# Patient Record
Sex: MALE | Race: BLACK OR AFRICAN AMERICAN | NOT HISPANIC OR LATINO | URBAN - METROPOLITAN AREA
[De-identification: names, ages, dates, MRNs, and addresses within clinical notes are randomized per-mention and may not be internally consistent; named-entity substitution may affect disease eponyms.]

---

## 2018-09-05 ENCOUNTER — INPATIENT HOSPITAL (OUTPATIENT)
Dept: URBAN - METROPOLITAN AREA HOSPITAL 95 | Facility: HOSPITAL | Age: 58
End: 2018-09-05
Payer: COMMERCIAL

## 2018-09-05 DIAGNOSIS — D50.9 IRON DEFICIENCY ANEMIA, UNSPECIFIED: ICD-10-CM

## 2018-09-05 DIAGNOSIS — K92.1 MELENA: ICD-10-CM

## 2018-09-05 PROCEDURE — 99254 IP/OBS CNSLTJ NEW/EST MOD 60: CPT | Performed by: INTERNAL MEDICINE

## 2018-09-06 ENCOUNTER — INPATIENT HOSPITAL (OUTPATIENT)
Dept: URBAN - METROPOLITAN AREA HOSPITAL 95 | Facility: HOSPITAL | Age: 58
End: 2018-09-06
Payer: COMMERCIAL

## 2018-09-06 DIAGNOSIS — D50.9 IRON DEFICIENCY ANEMIA, UNSPECIFIED: ICD-10-CM

## 2018-09-06 DIAGNOSIS — K64.8 OTHER HEMORRHOIDS: ICD-10-CM

## 2018-09-06 DIAGNOSIS — D12.2 BENIGN NEOPLASM OF ASCENDING COLON: ICD-10-CM

## 2018-09-06 DIAGNOSIS — K29.80 DUODENITIS WITHOUT BLEEDING: ICD-10-CM

## 2018-09-06 DIAGNOSIS — K92.2 GASTROINTESTINAL HEMORRHAGE, UNSPECIFIED: ICD-10-CM

## 2018-09-06 DIAGNOSIS — K92.1 MELENA: ICD-10-CM

## 2018-09-06 DIAGNOSIS — K31.7 POLYP OF STOMACH AND DUODENUM: ICD-10-CM

## 2018-09-06 DIAGNOSIS — K29.70 GASTRITIS, UNSPECIFIED, WITHOUT BLEEDING: ICD-10-CM

## 2018-09-06 PROCEDURE — 43239 EGD BIOPSY SINGLE/MULTIPLE: CPT | Performed by: INTERNAL MEDICINE

## 2018-09-07 PROCEDURE — 45380 COLONOSCOPY AND BIOPSY: CPT | Performed by: INTERNAL MEDICINE

## 2018-10-18 ENCOUNTER — OFFICE (OUTPATIENT)
Dept: URBAN - METROPOLITAN AREA CLINIC 11 | Facility: CLINIC | Age: 58
End: 2018-10-18
Payer: COMMERCIAL

## 2018-10-18 VITALS — WEIGHT: 315 LBS | SYSTOLIC BLOOD PRESSURE: 142 MMHG | DIASTOLIC BLOOD PRESSURE: 82 MMHG | HEIGHT: 70 IN

## 2018-10-18 DIAGNOSIS — K59.00 CONSTIPATION, UNSPECIFIED: ICD-10-CM

## 2018-10-18 DIAGNOSIS — E66.01 MORBID (SEVERE) OBESITY DUE TO EXCESS CALORIES: ICD-10-CM

## 2018-10-18 DIAGNOSIS — D50.9 IRON DEFICIENCY ANEMIA, UNSPECIFIED: ICD-10-CM

## 2018-10-18 DIAGNOSIS — N18.9 CHRONIC KIDNEY DISEASE, UNSPECIFIED: ICD-10-CM

## 2018-10-18 LAB
ANEMIA PROFILE A: BASO (ABSOLUTE): 0 X10E3/UL (ref 0–0.2)
ANEMIA PROFILE A: BASOS: 0 %
ANEMIA PROFILE A: EOS (ABSOLUTE): 0.1 X10E3/UL (ref 0–0.4)
ANEMIA PROFILE A: EOS: 2 %
ANEMIA PROFILE A: HEMATOCRIT: 31.9 % — LOW (ref 37.5–51)
ANEMIA PROFILE A: HEMOGLOBIN: 9.3 G/DL — LOW (ref 13–17.7)
ANEMIA PROFILE A: IMMATURE GRANS (ABS): 0 X10E3/UL (ref 0–0.1)
ANEMIA PROFILE A: IMMATURE GRANULOCYTES: 0 %
ANEMIA PROFILE A: IRON BIND.CAP.(TIBC): 315 UG/DL (ref 250–450)
ANEMIA PROFILE A: IRON SATURATION: 10 % — LOW (ref 15–55)
ANEMIA PROFILE A: IRON: 31 UG/DL — LOW (ref 38–169)
ANEMIA PROFILE A: LYMPHS (ABSOLUTE): 1.5 X10E3/UL (ref 0.7–3.1)
ANEMIA PROFILE A: LYMPHS: 17 %
ANEMIA PROFILE A: MCH: 24.8 PG — LOW (ref 26.6–33)
ANEMIA PROFILE A: MCHC: 29.2 G/DL — LOW (ref 31.5–35.7)
ANEMIA PROFILE A: MCV: 85 FL (ref 79–97)
ANEMIA PROFILE A: MONOCYTES(ABSOLUTE): 0.6 X10E3/UL (ref 0.1–0.9)
ANEMIA PROFILE A: MONOCYTES: 7 %
ANEMIA PROFILE A: NEUTROPHILS (ABSOLUTE): 6.7 X10E3/UL (ref 1.4–7)
ANEMIA PROFILE A: NEUTROPHILS: 74 %
ANEMIA PROFILE A: PLATELETS: 434 X10E3/UL — HIGH (ref 150–379)
ANEMIA PROFILE A: RBC: 3.75 X10E6/UL — LOW (ref 4.14–5.8)
ANEMIA PROFILE A: RDW: 16.7 % — HIGH (ref 12.3–15.4)
ANEMIA PROFILE A: RETICULOCYTE COUNT: 2.3 % (ref 0.6–2.6)
ANEMIA PROFILE A: UIBC: 284 UG/DL (ref 111–343)
ANEMIA PROFILE A: WBC: 8.9 X10E3/UL (ref 3.4–10.8)
FERRITIN, SERUM: 22 NG/ML — LOW (ref 30–400)
THYROID PANEL WITH TSH: FREE THYROXINE INDEX: 2 (ref 1.2–4.9)
THYROID PANEL WITH TSH: T3 UPTAKE: 27 % (ref 24–39)
THYROID PANEL WITH TSH: THYROXINE (T4): 7.3 UG/DL (ref 4.5–12)
THYROID PANEL WITH TSH: TSH: 0.85 UIU/ML (ref 0.45–4.5)

## 2018-10-18 PROCEDURE — 99213 OFFICE O/P EST LOW 20 MIN: CPT | Performed by: INTERNAL MEDICINE

## 2018-10-18 RX ORDER — PANTOPRAZOLE SODIUM 40 MG/1
40 TABLET, DELAYED RELEASE ORAL
Qty: 30 | Refills: 11 | Status: ACTIVE
Start: 2018-10-18

## 2018-10-18 RX ORDER — FERROUS SULFATE TAB 325 MG (65 MG ELEMENTAL FE) 325 (65 FE) MG
325 TAB ORAL
Refills: 0 | Status: COMPLETED
End: 2018-10-18

## 2018-10-18 RX ORDER — FERROUS GLUCONATE 240(27)MG
480 TABLET ORAL
Qty: 60 | Refills: 5 | Status: COMPLETED
Start: 2018-10-18 | End: 2019-01-21

## 2018-10-18 RX ORDER — POLYETHYLENE GLYCOL 3350 17 G/17G
578 POWDER, FOR SOLUTION ORAL
Qty: 1120 | Refills: 5 | Status: COMPLETED
Start: 2018-10-18 | End: 2019-06-04

## 2018-10-18 NOTE — SERVICEHPINOTES
Mr. Valentino Berrios is a 59 yo  male with a past medical history of hypertension, diabetes, hyperlipidemia, stroke, stage 3 chronic kidney disease, gout who presented the hospital in early September with symptomatic anemia. He was found to be iron deficient that time.He underwent EGD on September 6, 2018 which showed mild duodenitis and gastritis with a benign-appearing gastric polyp. Pathology revealed minimal chronic gastritis H pylori negative no intestinal metaplasia.Darcy underwent colonoscopy on September 7, 2018 which showed a 3 mm sessile polyp in ascending colon which pathology showed was inflammatory polyp and small internal hemorrhoids.Darcy had a small bowel series before being discharged which showed a hiatal hernia at the GE junction, otherwise negative small bowel evaluation. since being discharged his energy is better.  He is no longer short of breath or week.  He was initially compliant with taking iron twice a day for several weeks, but developed significant constipation.  He also stopped due to the prescription cost of 66 dollars a month.   He has been off of it for 2-3 weeks.BR  He has had significant constipation with straining and passing small pellet-like bowel movements.  Some blood on the toilet paper.   Feels bloated prior a bowel movement and feels like his abdomen is more soft after defecation.BR  No heartburn, regurgitation, nausea, vomiting.   He does have decreased p.o. intake due to bad taste in his mouth.BR

## 2018-10-18 NOTE — SERVICENOTES
Main issues iron deficiency anemia.  Had EGD and colonoscopy without definite source found last month.  Discussed risks and benefits of capsule endoscopy the patient's wife.  Will proceed with capsule endoscopy.  Explained risk of capsule getting stuck.  He has had no surgeries.

Constipation likely from ferrous sulfate.  Will change to ferrous gluconate.  Check CBC and iron stores day.  He may have to have IV iron if he remains iron deficient constipated.
Discussed various options for constipation.  Recommend MiraLax twice a day for 2 weeks.  If no improvement can proceed with linaclotide her lubiprostone.  Will check TSH and thyroid studies.

## 2018-11-01 ENCOUNTER — OFFICE (OUTPATIENT)
Dept: URBAN - METROPOLITAN AREA CLINIC 11 | Facility: CLINIC | Age: 58
End: 2018-11-01
Payer: COMMERCIAL

## 2018-11-01 DIAGNOSIS — D50.9 IRON DEFICIENCY ANEMIA, UNSPECIFIED: ICD-10-CM

## 2018-11-01 DIAGNOSIS — K92.1 MELENA: ICD-10-CM

## 2018-11-01 PROCEDURE — 91110 GI TRC IMG INTRAL ESOPH-ILE: CPT | Performed by: INTERNAL MEDICINE

## 2019-01-09 ENCOUNTER — INPATIENT HOSPITAL (OUTPATIENT)
Dept: URBAN - METROPOLITAN AREA HOSPITAL 95 | Facility: HOSPITAL | Age: 59
End: 2019-01-09

## 2019-01-09 DIAGNOSIS — D50.9 IRON DEFICIENCY ANEMIA, UNSPECIFIED: ICD-10-CM

## 2019-01-09 DIAGNOSIS — K92.2 GASTROINTESTINAL HEMORRHAGE, UNSPECIFIED: ICD-10-CM

## 2019-01-09 DIAGNOSIS — K92.1 MELENA: ICD-10-CM

## 2019-01-09 PROCEDURE — 99254 IP/OBS CNSLTJ NEW/EST MOD 60: CPT | Performed by: INTERNAL MEDICINE

## 2019-01-10 PROCEDURE — 44360 SMALL BOWEL ENDOSCOPY: CPT | Performed by: INTERNAL MEDICINE

## 2019-01-21 ENCOUNTER — OFFICE (OUTPATIENT)
Dept: URBAN - METROPOLITAN AREA CLINIC 11 | Facility: CLINIC | Age: 59
End: 2019-01-21
Payer: COMMERCIAL

## 2019-01-21 VITALS
WEIGHT: 296 LBS | DIASTOLIC BLOOD PRESSURE: 90 MMHG | HEART RATE: 108 BPM | SYSTOLIC BLOOD PRESSURE: 146 MMHG | HEIGHT: 70 IN

## 2019-01-21 DIAGNOSIS — D50.9 IRON DEFICIENCY ANEMIA, UNSPECIFIED: ICD-10-CM

## 2019-01-21 DIAGNOSIS — E66.01 MORBID (SEVERE) OBESITY DUE TO EXCESS CALORIES: ICD-10-CM

## 2019-01-21 DIAGNOSIS — R63.4 ABNORMAL WEIGHT LOSS: ICD-10-CM

## 2019-01-21 DIAGNOSIS — Q27.33 ARTERIOVENOUS MALFORMATION OF DIGESTIVE SYSTEM VESSEL: ICD-10-CM

## 2019-01-21 DIAGNOSIS — R19.7 DIARRHEA, UNSPECIFIED: ICD-10-CM

## 2019-01-21 DIAGNOSIS — N18.9 CHRONIC KIDNEY DISEASE, UNSPECIFIED: ICD-10-CM

## 2019-01-21 LAB
ANEMIA PROFILE A: BASO (ABSOLUTE): 0 X10E3/UL (ref 0–0.2)
ANEMIA PROFILE A: BASOS: 0 %
ANEMIA PROFILE A: EOS (ABSOLUTE): 1.4 X10E3/UL — HIGH (ref 0–0.4)
ANEMIA PROFILE A: EOS: 10 %
ANEMIA PROFILE A: HEMATOCRIT: 37.6 % (ref 37.5–51)
ANEMIA PROFILE A: HEMATOLOGY COMMENTS: (no result)
ANEMIA PROFILE A: HEMOGLOBIN: 11.3 G/DL — LOW (ref 13–17.7)
ANEMIA PROFILE A: IMMATURE CELLS: (no result)
ANEMIA PROFILE A: IMMATURE GRANS (ABS): (no result)
ANEMIA PROFILE A: IMMATURE GRANULOCYTES: (no result)
ANEMIA PROFILE A: IRON BIND.CAP.(TIBC): 257 UG/DL (ref 250–450)
ANEMIA PROFILE A: IRON SATURATION: 27 % (ref 15–55)
ANEMIA PROFILE A: IRON: 70 UG/DL (ref 38–169)
ANEMIA PROFILE A: LYMPHS (ABSOLUTE): 2.4 X10E3/UL (ref 0.7–3.1)
ANEMIA PROFILE A: LYMPHS: 18 %
ANEMIA PROFILE A: MCH: 25.3 PG — LOW (ref 26.6–33)
ANEMIA PROFILE A: MCHC: 30.1 G/DL — LOW (ref 31.5–35.7)
ANEMIA PROFILE A: MCV: 84 FL (ref 79–97)
ANEMIA PROFILE A: MONOCYTES(ABSOLUTE): 1.1 X10E3/UL — HIGH (ref 0.1–0.9)
ANEMIA PROFILE A: MONOCYTES: 8 %
ANEMIA PROFILE A: NEUTROPHILS (ABSOLUTE): 8.6 X10E3/UL — HIGH (ref 1.4–7)
ANEMIA PROFILE A: NEUTROPHILS: 64 %
ANEMIA PROFILE A: NRBC: 1 % — HIGH (ref 0–0)
ANEMIA PROFILE A: PLATELETS: 493 X10E3/UL — HIGH (ref 150–379)
ANEMIA PROFILE A: RBC: 4.47 X10E6/UL (ref 4.14–5.8)
ANEMIA PROFILE A: RDW: 22.1 % — HIGH (ref 12.3–15.4)
ANEMIA PROFILE A: RETICULOCYTE COUNT: 3.5 % — HIGH (ref 0.6–2.6)
ANEMIA PROFILE A: UIBC: 187 UG/DL (ref 111–343)
ANEMIA PROFILE A: WBC: 13.5 X10E3/UL — HIGH (ref 3.4–10.8)
FERRITIN, SERUM: 499 NG/ML — HIGH (ref 30–400)

## 2019-01-21 PROCEDURE — 99214 OFFICE O/P EST MOD 30 MIN: CPT | Performed by: INTERNAL MEDICINE

## 2019-01-21 PROCEDURE — 36415 COLL VENOUS BLD VENIPUNCTURE: CPT | Performed by: INTERNAL MEDICINE

## 2019-01-25 ENCOUNTER — OFFICE (OUTPATIENT)
Dept: URBAN - METROPOLITAN AREA CLINIC 11 | Facility: CLINIC | Age: 59
End: 2019-01-25

## 2019-01-25 LAB
C DIFFICILE TOXIN GENE NAA: NEGATIVE
GI PROFILE, STOOL, PCR: ADENOVIRUS F 40/41: NOT DETECTED
GI PROFILE, STOOL, PCR: ASTROVIRUS: NOT DETECTED
GI PROFILE, STOOL, PCR: C DIFFICILE TOXIN A/B: NOT DETECTED
GI PROFILE, STOOL, PCR: CAMPYLOBACTER: NOT DETECTED
GI PROFILE, STOOL, PCR: CRYPTOSPORIDIUM: NOT DETECTED
GI PROFILE, STOOL, PCR: CYCLOSPORA CAYETANENSIS: NOT DETECTED
GI PROFILE, STOOL, PCR: E COLI O157: (no result)
GI PROFILE, STOOL, PCR: ENTAMOEBA HISTOLYTICA: NOT DETECTED
GI PROFILE, STOOL, PCR: ENTEROAGGREGATIVE E COLI: NOT DETECTED
GI PROFILE, STOOL, PCR: ENTEROPATHOGENIC E COLI: NOT DETECTED
GI PROFILE, STOOL, PCR: ENTEROTOXIGENIC E COLI: NOT DETECTED
GI PROFILE, STOOL, PCR: GIARDIA LAMBLIA: NOT DETECTED
GI PROFILE, STOOL, PCR: NOROVIRUS GI/GII: NOT DETECTED
GI PROFILE, STOOL, PCR: PLESIOMONAS SHIGELLOIDES: NOT DETECTED
GI PROFILE, STOOL, PCR: ROTAVIRUS A: NOT DETECTED
GI PROFILE, STOOL, PCR: SALMONELLA: NOT DETECTED
GI PROFILE, STOOL, PCR: SAPOVIRUS: NOT DETECTED
GI PROFILE, STOOL, PCR: SHIGA-TOXIN-PRODUCING E COLI: NOT DETECTED
GI PROFILE, STOOL, PCR: SHIGELLA/ENTEROINVASIVE E COLI: NOT DETECTED
GI PROFILE, STOOL, PCR: VIBRIO CHOLERAE: NOT DETECTED
GI PROFILE, STOOL, PCR: VIBRIO: NOT DETECTED
GI PROFILE, STOOL, PCR: YERSINIA ENTEROCOLITICA: NOT DETECTED

## 2019-03-05 ENCOUNTER — AMBULATORY SURGICAL CENTER (OUTPATIENT)
Dept: URBAN - METROPOLITAN AREA SURGERY 2 | Facility: SURGERY | Age: 59
End: 2019-03-05
Payer: COMMERCIAL

## 2019-03-05 VITALS
DIASTOLIC BLOOD PRESSURE: 67 MMHG | SYSTOLIC BLOOD PRESSURE: 136 MMHG | DIASTOLIC BLOOD PRESSURE: 77 MMHG | TEMPERATURE: 97.8 F | WEIGHT: 296 LBS | HEART RATE: 89 BPM | DIASTOLIC BLOOD PRESSURE: 76 MMHG | SYSTOLIC BLOOD PRESSURE: 136 MMHG | SYSTOLIC BLOOD PRESSURE: 116 MMHG | OXYGEN SATURATION: 98 % | WEIGHT: 296 LBS | SYSTOLIC BLOOD PRESSURE: 128 MMHG | SYSTOLIC BLOOD PRESSURE: 116 MMHG | HEART RATE: 87 BPM | RESPIRATION RATE: 18 BRPM | HEART RATE: 83 BPM | HEART RATE: 86 BPM | HEART RATE: 89 BPM | TEMPERATURE: 97.9 F | DIASTOLIC BLOOD PRESSURE: 75 MMHG | RESPIRATION RATE: 18 BRPM | SYSTOLIC BLOOD PRESSURE: 119 MMHG | HEART RATE: 86 BPM | DIASTOLIC BLOOD PRESSURE: 65 MMHG | TEMPERATURE: 97.9 F | HEIGHT: 70 IN | HEART RATE: 83 BPM | HEART RATE: 84 BPM | OXYGEN SATURATION: 99 % | OXYGEN SATURATION: 99 % | OXYGEN SATURATION: 100 % | RESPIRATION RATE: 16 BRPM | DIASTOLIC BLOOD PRESSURE: 67 MMHG | SYSTOLIC BLOOD PRESSURE: 129 MMHG | HEART RATE: 87 BPM | SYSTOLIC BLOOD PRESSURE: 142 MMHG | TEMPERATURE: 97.8 F | OXYGEN SATURATION: 100 % | DIASTOLIC BLOOD PRESSURE: 76 MMHG | HEIGHT: 70 IN | RESPIRATION RATE: 16 BRPM | SYSTOLIC BLOOD PRESSURE: 128 MMHG | SYSTOLIC BLOOD PRESSURE: 142 MMHG | OXYGEN SATURATION: 98 % | DIASTOLIC BLOOD PRESSURE: 65 MMHG | SYSTOLIC BLOOD PRESSURE: 129 MMHG | DIASTOLIC BLOOD PRESSURE: 75 MMHG | SYSTOLIC BLOOD PRESSURE: 119 MMHG | HEART RATE: 84 BPM | DIASTOLIC BLOOD PRESSURE: 77 MMHG

## 2019-03-05 DIAGNOSIS — K92.2 GASTROINTESTINAL HEMORRHAGE, UNSPECIFIED: ICD-10-CM

## 2019-03-05 DIAGNOSIS — D50.9 IRON DEFICIENCY ANEMIA, UNSPECIFIED: ICD-10-CM

## 2019-03-05 PROCEDURE — 44376 SMALL BOWEL ENDOSCOPY: CPT | Performed by: INTERNAL MEDICINE

## 2019-05-03 ENCOUNTER — OFFICE (OUTPATIENT)
Dept: URBAN - METROPOLITAN AREA CLINIC 11 | Facility: CLINIC | Age: 59
End: 2019-05-03

## 2019-05-23 ENCOUNTER — OFFICE (OUTPATIENT)
Dept: URBAN - METROPOLITAN AREA CLINIC 14 | Facility: CLINIC | Age: 59
End: 2019-05-23
Payer: COMMERCIAL

## 2019-05-23 DIAGNOSIS — R14.0 ABDOMINAL DISTENSION (GASEOUS): ICD-10-CM

## 2019-05-23 PROCEDURE — 91065 BREATH HYDROGEN/METHANE TEST: CPT | Performed by: INTERNAL MEDICINE

## 2019-06-04 ENCOUNTER — OFFICE (OUTPATIENT)
Dept: URBAN - METROPOLITAN AREA CLINIC 11 | Facility: CLINIC | Age: 59
End: 2019-06-04
Payer: COMMERCIAL

## 2019-06-04 VITALS
HEIGHT: 70 IN | HEART RATE: 104 BPM | SYSTOLIC BLOOD PRESSURE: 137 MMHG | DIASTOLIC BLOOD PRESSURE: 85 MMHG | WEIGHT: 304 LBS

## 2019-06-04 DIAGNOSIS — E66.01 MORBID (SEVERE) OBESITY DUE TO EXCESS CALORIES: ICD-10-CM

## 2019-06-04 DIAGNOSIS — D50.9 IRON DEFICIENCY ANEMIA, UNSPECIFIED: ICD-10-CM

## 2019-06-04 DIAGNOSIS — A04.9 BACTERIAL INTESTINAL INFECTION, UNSPECIFIED: ICD-10-CM

## 2019-06-04 DIAGNOSIS — N18.9 CHRONIC KIDNEY DISEASE, UNSPECIFIED: ICD-10-CM

## 2019-06-04 DIAGNOSIS — Q27.33 ARTERIOVENOUS MALFORMATION OF DIGESTIVE SYSTEM VESSEL: ICD-10-CM

## 2019-06-04 PROCEDURE — 99213 OFFICE O/P EST LOW 20 MIN: CPT | Performed by: INTERNAL MEDICINE

## 2019-08-09 ENCOUNTER — OFFICE (OUTPATIENT)
Dept: URBAN - METROPOLITAN AREA CLINIC 11 | Facility: CLINIC | Age: 59
End: 2019-08-09
Payer: COMMERCIAL

## 2019-08-09 VITALS
SYSTOLIC BLOOD PRESSURE: 151 MMHG | WEIGHT: 305 LBS | DIASTOLIC BLOOD PRESSURE: 97 MMHG | HEIGHT: 70 IN | HEART RATE: 93 BPM

## 2019-08-09 DIAGNOSIS — R14.0 ABDOMINAL DISTENSION (GASEOUS): ICD-10-CM

## 2019-08-09 DIAGNOSIS — Q27.33 ARTERIOVENOUS MALFORMATION OF DIGESTIVE SYSTEM VESSEL: ICD-10-CM

## 2019-08-09 DIAGNOSIS — D50.9 IRON DEFICIENCY ANEMIA, UNSPECIFIED: ICD-10-CM

## 2019-08-09 DIAGNOSIS — A04.9 BACTERIAL INTESTINAL INFECTION, UNSPECIFIED: ICD-10-CM

## 2019-08-09 PROCEDURE — 99213 OFFICE O/P EST LOW 20 MIN: CPT | Performed by: INTERNAL MEDICINE

## 2019-08-09 RX ORDER — RIFAXIMIN 550 MG/1
1650 TABLET ORAL
Qty: 42 | Refills: 0 | Status: COMPLETED
Start: 2019-08-09 | End: 2019-09-24

## 2019-08-09 RX ORDER — METRONIDAZOLE 500 MG/1
1500 TABLET, FILM COATED ORAL
Qty: 42 | Refills: 0 | Status: COMPLETED
Start: 2019-08-09 | End: 2019-09-24

## 2019-10-07 ENCOUNTER — OFFICE (OUTPATIENT)
Dept: URBAN - METROPOLITAN AREA CLINIC 11 | Facility: CLINIC | Age: 59
End: 2019-10-07
Payer: COMMERCIAL

## 2019-10-07 VITALS
WEIGHT: 302 LBS | HEIGHT: 71 IN | HEART RATE: 95 BPM | DIASTOLIC BLOOD PRESSURE: 83 MMHG | SYSTOLIC BLOOD PRESSURE: 133 MMHG

## 2019-10-07 DIAGNOSIS — N18.9 CHRONIC KIDNEY DISEASE, UNSPECIFIED: ICD-10-CM

## 2019-10-07 DIAGNOSIS — D50.9 IRON DEFICIENCY ANEMIA, UNSPECIFIED: ICD-10-CM

## 2019-10-07 DIAGNOSIS — R14.0 ABDOMINAL DISTENSION (GASEOUS): ICD-10-CM

## 2019-10-07 LAB
FE+TIBC+FER: FERRITIN, SERUM: 18 NG/ML — LOW (ref 30–400)
FE+TIBC+FER: IRON BIND.CAP.(TIBC): 317 UG/DL (ref 250–450)
FE+TIBC+FER: IRON SATURATION: 9 % — CRITICAL LOW (ref 15–55)
FE+TIBC+FER: IRON: 30 UG/DL — LOW (ref 38–169)
FE+TIBC+FER: UIBC: 287 UG/DL (ref 111–343)
HEMOGLOBIN: 10.8 G/DL — LOW (ref 13–17.7)

## 2019-10-07 PROCEDURE — 99213 OFFICE O/P EST LOW 20 MIN: CPT | Performed by: INTERNAL MEDICINE

## 2019-10-07 RX ORDER — LINACLOTIDE 145 UG/1
CAPSULE, GELATIN COATED ORAL
Qty: 30 | Refills: 3 | Status: COMPLETED
Start: 2018-11-05 | End: 2022-05-05

## 2019-12-20 ENCOUNTER — OFFICE (OUTPATIENT)
Dept: URBAN - METROPOLITAN AREA CLINIC 11 | Facility: CLINIC | Age: 59
End: 2019-12-20
Payer: COMMERCIAL

## 2019-12-20 VITALS
SYSTOLIC BLOOD PRESSURE: 132 MMHG | HEART RATE: 101 BPM | HEIGHT: 71 IN | WEIGHT: 293 LBS | DIASTOLIC BLOOD PRESSURE: 88 MMHG

## 2019-12-20 DIAGNOSIS — R63.4 ABNORMAL WEIGHT LOSS: ICD-10-CM

## 2019-12-20 DIAGNOSIS — K59.00 CONSTIPATION, UNSPECIFIED: ICD-10-CM

## 2019-12-20 DIAGNOSIS — Q27.33 ARTERIOVENOUS MALFORMATION OF DIGESTIVE SYSTEM VESSEL: ICD-10-CM

## 2019-12-20 DIAGNOSIS — D50.9 IRON DEFICIENCY ANEMIA, UNSPECIFIED: ICD-10-CM

## 2019-12-20 LAB
CBC, PLATELET, NO DIFFERENTIAL: HEMATOCRIT: 36.6 % — LOW (ref 37.5–51)
CBC, PLATELET, NO DIFFERENTIAL: HEMOGLOBIN: 10.9 G/DL — LOW (ref 13–17.7)
CBC, PLATELET, NO DIFFERENTIAL: MCH: 26 PG — LOW (ref 26.6–33)
CBC, PLATELET, NO DIFFERENTIAL: MCHC: 29.8 G/DL — LOW (ref 31.5–35.7)
CBC, PLATELET, NO DIFFERENTIAL: MCV: 87 FL (ref 79–97)
CBC, PLATELET, NO DIFFERENTIAL: PLATELETS: 460 X10E3/UL — HIGH (ref 150–450)
CBC, PLATELET, NO DIFFERENTIAL: RBC: 4.2 X10E6/UL (ref 4.14–5.8)
CBC, PLATELET, NO DIFFERENTIAL: RDW: 16.3 % — HIGH (ref 12.3–15.4)
CBC, PLATELET, NO DIFFERENTIAL: WBC: 8.6 X10E3/UL (ref 3.4–10.8)
FE+TIBC+FER: FERRITIN, SERUM: 43 NG/ML (ref 30–400)
FE+TIBC+FER: IRON BIND.CAP.(TIBC): 298 UG/DL (ref 250–450)
FE+TIBC+FER: IRON SATURATION: 13 % — LOW (ref 15–55)
FE+TIBC+FER: IRON: 38 UG/DL (ref 38–169)
FE+TIBC+FER: UIBC: 260 UG/DL (ref 111–343)

## 2019-12-20 PROCEDURE — 99213 OFFICE O/P EST LOW 20 MIN: CPT | Performed by: INTERNAL MEDICINE

## 2020-04-22 ENCOUNTER — OFFICE (OUTPATIENT)
Dept: URBAN - METROPOLITAN AREA CLINIC 11 | Facility: CLINIC | Age: 60
End: 2020-04-22
Payer: COMMERCIAL

## 2020-04-22 VITALS
HEART RATE: 91 BPM | SYSTOLIC BLOOD PRESSURE: 143 MMHG | WEIGHT: 298 LBS | HEIGHT: 71 IN | DIASTOLIC BLOOD PRESSURE: 88 MMHG

## 2020-04-22 DIAGNOSIS — D50.9 IRON DEFICIENCY ANEMIA, UNSPECIFIED: ICD-10-CM

## 2020-04-22 DIAGNOSIS — K59.00 CONSTIPATION, UNSPECIFIED: ICD-10-CM

## 2020-04-22 DIAGNOSIS — K21.9 GASTRO-ESOPHAGEAL REFLUX DISEASE WITHOUT ESOPHAGITIS: ICD-10-CM

## 2020-04-22 PROCEDURE — 99212 OFFICE O/P EST SF 10 MIN: CPT | Performed by: INTERNAL MEDICINE

## 2020-04-22 RX ORDER — PANTOPRAZOLE SODIUM 40 MG/1
40 TABLET, DELAYED RELEASE ORAL
Qty: 30 | Refills: 11 | Status: ACTIVE
Start: 2018-10-18

## 2020-10-23 ENCOUNTER — OFFICE (OUTPATIENT)
Dept: URBAN - METROPOLITAN AREA CLINIC 11 | Facility: CLINIC | Age: 60
End: 2020-10-23
Payer: COMMERCIAL

## 2020-10-23 VITALS
HEIGHT: 71 IN | HEART RATE: 91 BPM | SYSTOLIC BLOOD PRESSURE: 162 MMHG | WEIGHT: 283 LBS | DIASTOLIC BLOOD PRESSURE: 94 MMHG | OXYGEN SATURATION: 97 %

## 2020-10-23 DIAGNOSIS — D50.9 IRON DEFICIENCY ANEMIA, UNSPECIFIED: ICD-10-CM

## 2020-10-23 DIAGNOSIS — R14.0 ABDOMINAL DISTENSION (GASEOUS): ICD-10-CM

## 2020-10-23 DIAGNOSIS — K59.00 CONSTIPATION, UNSPECIFIED: ICD-10-CM

## 2020-10-23 DIAGNOSIS — Q27.33 ARTERIOVENOUS MALFORMATION OF DIGESTIVE SYSTEM VESSEL: ICD-10-CM

## 2020-10-23 DIAGNOSIS — R63.4 ABNORMAL WEIGHT LOSS: ICD-10-CM

## 2020-10-23 DIAGNOSIS — R11.0 NAUSEA: ICD-10-CM

## 2020-10-23 PROCEDURE — 99214 OFFICE O/P EST MOD 30 MIN: CPT | Performed by: INTERNAL MEDICINE

## 2021-08-30 ENCOUNTER — OFFICE (OUTPATIENT)
Dept: URBAN - METROPOLITAN AREA CLINIC 11 | Facility: CLINIC | Age: 61
End: 2021-08-30
Payer: COMMERCIAL

## 2021-08-30 VITALS
DIASTOLIC BLOOD PRESSURE: 91 MMHG | OXYGEN SATURATION: 100 % | SYSTOLIC BLOOD PRESSURE: 167 MMHG | HEART RATE: 71 BPM | WEIGHT: 254 LBS | HEIGHT: 71 IN

## 2021-08-30 DIAGNOSIS — D50.9 IRON DEFICIENCY ANEMIA, UNSPECIFIED: ICD-10-CM

## 2021-08-30 DIAGNOSIS — R10.9 UNSPECIFIED ABDOMINAL PAIN: ICD-10-CM

## 2021-08-30 DIAGNOSIS — E11.9 TYPE 2 DIABETES MELLITUS WITHOUT COMPLICATIONS: ICD-10-CM

## 2021-08-30 DIAGNOSIS — R63.4 ABNORMAL WEIGHT LOSS: ICD-10-CM

## 2021-08-30 DIAGNOSIS — K59.00 CONSTIPATION, UNSPECIFIED: ICD-10-CM

## 2021-08-30 PROCEDURE — 99214 OFFICE O/P EST MOD 30 MIN: CPT | Performed by: INTERNAL MEDICINE

## 2021-09-17 ENCOUNTER — OFFICE (OUTPATIENT)
Dept: URBAN - METROPOLITAN AREA CLINIC 22 | Facility: CLINIC | Age: 61
End: 2021-09-17

## 2021-09-17 DIAGNOSIS — R10.11 RIGHT UPPER QUADRANT PAIN: ICD-10-CM

## 2021-09-17 DIAGNOSIS — R10.31 RIGHT LOWER QUADRANT PAIN: ICD-10-CM

## 2021-09-17 PROCEDURE — 74176 CT ABD & PELVIS W/O CONTRAST: CPT | Mod: TC | Performed by: INTERNAL MEDICINE

## 2021-09-24 ENCOUNTER — AMBULATORY SURGICAL CENTER (OUTPATIENT)
Dept: URBAN - METROPOLITAN AREA SURGERY 2 | Facility: SURGERY | Age: 61
End: 2021-09-24
Payer: COMMERCIAL

## 2021-09-24 ENCOUNTER — AMBULATORY SURGICAL CENTER (OUTPATIENT)
Dept: URBAN - METROPOLITAN AREA SURGERY 2 | Facility: SURGERY | Age: 61
End: 2021-09-24

## 2021-09-24 ENCOUNTER — OFFICE (OUTPATIENT)
Dept: URBAN - METROPOLITAN AREA PATHOLOGY 22 | Facility: PATHOLOGY | Age: 61
End: 2021-09-24
Payer: COMMERCIAL

## 2021-09-24 ENCOUNTER — AMBULATORY SURGICAL CENTER (OUTPATIENT)
Dept: URBAN - METROPOLITAN AREA SURGERY 2 | Facility: SURGERY | Age: 61
End: 2021-09-24
Payer: MEDICARE

## 2021-09-24 VITALS
RESPIRATION RATE: 16 BRPM | SYSTOLIC BLOOD PRESSURE: 152 MMHG | OXYGEN SATURATION: 96 % | SYSTOLIC BLOOD PRESSURE: 169 MMHG | RESPIRATION RATE: 18 BRPM | DIASTOLIC BLOOD PRESSURE: 75 MMHG | TEMPERATURE: 97.2 F | RESPIRATION RATE: 18 BRPM | SYSTOLIC BLOOD PRESSURE: 164 MMHG | SYSTOLIC BLOOD PRESSURE: 160 MMHG | DIASTOLIC BLOOD PRESSURE: 98 MMHG | HEIGHT: 71 IN | WEIGHT: 250 LBS | HEART RATE: 69 BPM | DIASTOLIC BLOOD PRESSURE: 81 MMHG | TEMPERATURE: 98.2 F | SYSTOLIC BLOOD PRESSURE: 152 MMHG | SYSTOLIC BLOOD PRESSURE: 169 MMHG | HEART RATE: 69 BPM | HEART RATE: 73 BPM | WEIGHT: 250 LBS | DIASTOLIC BLOOD PRESSURE: 74 MMHG | RESPIRATION RATE: 18 BRPM | OXYGEN SATURATION: 95 % | SYSTOLIC BLOOD PRESSURE: 164 MMHG | HEART RATE: 67 BPM | HEART RATE: 70 BPM | HEART RATE: 67 BPM | OXYGEN SATURATION: 92 % | DIASTOLIC BLOOD PRESSURE: 74 MMHG | OXYGEN SATURATION: 92 % | RESPIRATION RATE: 15 BRPM | DIASTOLIC BLOOD PRESSURE: 74 MMHG | OXYGEN SATURATION: 92 % | TEMPERATURE: 97.2 F | SYSTOLIC BLOOD PRESSURE: 164 MMHG | SYSTOLIC BLOOD PRESSURE: 169 MMHG | RESPIRATION RATE: 15 BRPM | HEIGHT: 71 IN | HEART RATE: 71 BPM | RESPIRATION RATE: 17 BRPM | DIASTOLIC BLOOD PRESSURE: 81 MMHG | HEART RATE: 70 BPM | TEMPERATURE: 98.2 F | RESPIRATION RATE: 15 BRPM | SYSTOLIC BLOOD PRESSURE: 150 MMHG | DIASTOLIC BLOOD PRESSURE: 98 MMHG | TEMPERATURE: 97.2 F | HEART RATE: 71 BPM | DIASTOLIC BLOOD PRESSURE: 81 MMHG | OXYGEN SATURATION: 96 % | RESPIRATION RATE: 17 BRPM | HEART RATE: 71 BPM | HEART RATE: 67 BPM | OXYGEN SATURATION: 95 % | RESPIRATION RATE: 16 BRPM | RESPIRATION RATE: 16 BRPM | OXYGEN SATURATION: 95 % | HEART RATE: 73 BPM | HEART RATE: 70 BPM | HEART RATE: 69 BPM | DIASTOLIC BLOOD PRESSURE: 75 MMHG | DIASTOLIC BLOOD PRESSURE: 98 MMHG | DIASTOLIC BLOOD PRESSURE: 75 MMHG | RESPIRATION RATE: 17 BRPM | HEIGHT: 71 IN | SYSTOLIC BLOOD PRESSURE: 160 MMHG | TEMPERATURE: 98.2 F | HEART RATE: 73 BPM | WEIGHT: 250 LBS | SYSTOLIC BLOOD PRESSURE: 152 MMHG | SYSTOLIC BLOOD PRESSURE: 160 MMHG | OXYGEN SATURATION: 96 % | SYSTOLIC BLOOD PRESSURE: 150 MMHG | SYSTOLIC BLOOD PRESSURE: 150 MMHG

## 2021-09-24 DIAGNOSIS — R63.4 ABNORMAL WEIGHT LOSS: ICD-10-CM

## 2021-09-24 DIAGNOSIS — K31.89 OTHER DISEASES OF STOMACH AND DUODENUM: ICD-10-CM

## 2021-09-24 DIAGNOSIS — R10.13 EPIGASTRIC PAIN: ICD-10-CM

## 2021-09-24 PROCEDURE — 88313 SPECIAL STAINS GROUP 2: CPT | Performed by: INTERNAL MEDICINE

## 2021-09-24 PROCEDURE — 88342 IMHCHEM/IMCYTCHM 1ST ANTB: CPT | Performed by: INTERNAL MEDICINE

## 2021-09-24 PROCEDURE — G8907 PT DOC NO EVENTS ON DISCHARG: HCPCS | Performed by: INTERNAL MEDICINE

## 2021-09-24 PROCEDURE — G8918 PT W/O PREOP ORDER IV AB PRO: HCPCS | Performed by: INTERNAL MEDICINE

## 2021-09-24 PROCEDURE — 43239 EGD BIOPSY SINGLE/MULTIPLE: CPT | Performed by: INTERNAL MEDICINE

## 2021-09-24 PROCEDURE — 88305 TISSUE EXAM BY PATHOLOGIST: CPT | Performed by: INTERNAL MEDICINE

## 2022-05-05 ENCOUNTER — OFFICE (OUTPATIENT)
Dept: URBAN - METROPOLITAN AREA CLINIC 11 | Facility: CLINIC | Age: 62
End: 2022-05-05

## 2022-05-05 VITALS
OXYGEN SATURATION: 99 % | DIASTOLIC BLOOD PRESSURE: 66 MMHG | HEIGHT: 71 IN | HEART RATE: 73 BPM | SYSTOLIC BLOOD PRESSURE: 114 MMHG | WEIGHT: 213 LBS

## 2022-05-05 DIAGNOSIS — N18.9 CHRONIC KIDNEY DISEASE, UNSPECIFIED: ICD-10-CM

## 2022-05-05 DIAGNOSIS — Q27.33 ARTERIOVENOUS MALFORMATION OF DIGESTIVE SYSTEM VESSEL: ICD-10-CM

## 2022-05-05 DIAGNOSIS — E11.9 TYPE 2 DIABETES MELLITUS WITHOUT COMPLICATIONS: ICD-10-CM

## 2022-05-05 DIAGNOSIS — K59.00 CONSTIPATION, UNSPECIFIED: ICD-10-CM

## 2022-05-05 DIAGNOSIS — D50.9 IRON DEFICIENCY ANEMIA, UNSPECIFIED: ICD-10-CM

## 2022-05-05 DIAGNOSIS — R63.4 ABNORMAL WEIGHT LOSS: ICD-10-CM

## 2022-05-05 PROCEDURE — 99214 OFFICE O/P EST MOD 30 MIN: CPT | Performed by: INTERNAL MEDICINE

## 2022-05-05 RX ORDER — SODIUM SULFATE, POTASSIUM SULFATE, MAGNESIUM SULFATE 17.5; 3.13; 1.6 G/ML; G/ML; G/ML
SOLUTION, CONCENTRATE ORAL
Qty: 354 | Refills: 0 | Status: COMPLETED
Start: 2022-05-05 | End: 2022-06-02

## 2022-05-05 NOTE — SERVICEHPINOTES
Mr. Berrios is a 59 yo AAM with history of hypertension, hyperlipidemia, diabetes, stroke, iron deficiency anemia secondary to distal small bowel AVMs who is here for follow-up. Previous GI work up included EGD on September 6, 2018 which showed mild duodenitis and gastritis with a benign-appearing gastric polyp. Pathology revealed minimal chronic gastritis H pylori negative no intestinal metaplasia.Krysten underwent colonoscopy on September 7, 2018 which showed a 3 mm sessile polyp in ascending colon which pathology showed was inflammatory polyp and small internal hemorrhoids.Krysten had a capsule endoscopy 11/1/18 which showed diffiuse telangiectasias, more so in the distal small bowel. Push enteroscopy by Dr. Sánchez which was normal aside from mild esophagitis.Krysten had previous capsule showing distal small bowel AVMs. We discussed possibility of sending him for a double balloon enteroscopy, but decided not to since his blood counts improved so much off plavix.brLabs from April 21, 2020 reviewed from the [facility]. At that time his hemoglobin is 14.8, hematocrit 44.4On follow up in April 22, 2020 He had 2 main GI complaints today. One being nausea and dry heaving every morning upon awakening. He very rarely throws up. He does not think he has much heartburn. He usually feels better throughout the day from a nausea standpoint. His wife relates that to his insulin and his altered eating and sleep habits. He eats very little throughout the day while she is at work. They eat a big dinner about 730 or 8 and then he snacks throughout the night and does not really go to sleep until 2 or 3:00 a.m..br other issue at that time was constipation. He was having a bowel movement on average every other day with incomplete evacuation. He states his bowels are never normal. He does not take Linzess anymore. We had talked about going to every other day dosing to help reduce his bloating.On follow-up 10/23/20, he was overall doing okay. He had lost 14 lb unintentionally. He was eating less and having early satiety. He still gagged first thing in the morning and was nauseated during the mornings and then felt great the rest of the day. His blood sugars had not been below 200 and had been in the 300s. He was moving his bowels most days, but had low volume stools. He was not taking Linzess or MiraLax. No hematochezia or melena. We proceeded with a 4 hour solid gastric emptying study on October 30, 2020 which was normal. Recent labs from Dr. Rivas's office showed a hemoglobin in normal range at 14.4.On follow-up 8/30/2021, he reported that he had lost another 30 pounds in the last 6 or 7 months. He was eating very little. He had no appetite. He was throwing up after the 1st bite approximately 2 to 3 times a week. In addition he reported he could no longer eat me as he cannot stand the texture and cannot swallow it down. He was moving his bowels on average every other day. No overt GI bleeding. He felt like he had a bulge on his right flank.
br
jeffrey
  On follow-up today, patient denies any recent overt bleeding. Per Dr. Villatoro's notes from 03/29/2022, hemoglobin had improved 14 but then dropped to 9.9 with iron sat of 16%. He was prescribed IV iron him received 2 doses of this (4/11 &amp 4/25/22). They were recently seen by Nephrology, who changed his Lyrica from 300 mg a day to 150 mg per day, also stopped his clonidine as his blood pressure has improved with his weight loss.  He has been off his ozempic since February. Patient endorses a decent appetite but reports he just does not eat a lot.  He has cut out meats from his diet for the most part, as he reports they no longer sound good.  No dysphagia.  Patient reports he does eats more vegetables.  He denies any abdominal pain, nausea, vomiting, diarrhea, melena, hematochezia or mucus.  Patient reports he has a bowel movement every day but states he has to strain and usually sits for prolonged amount of time, stool described as hard and typically and little balls.  He no longer takes that p.o. iron supplement as it was a worsening his constipation.  When asked about and improvement after firing, wife reports he typically bounces back and has more energy but has not this time.  Patient reports he believes it is because his Lyrica makes him so sleepy.jeffrey

## 2022-06-02 ENCOUNTER — AMBULATORY SURGICAL CENTER (OUTPATIENT)
Dept: URBAN - METROPOLITAN AREA SURGERY 3 | Facility: SURGERY | Age: 62
End: 2022-06-02

## 2022-06-02 ENCOUNTER — AMBULATORY SURGICAL CENTER (OUTPATIENT)
Dept: URBAN - METROPOLITAN AREA SURGERY 3 | Facility: SURGERY | Age: 62
End: 2022-06-02
Payer: COMMERCIAL

## 2022-06-02 ENCOUNTER — OFFICE (OUTPATIENT)
Dept: URBAN - METROPOLITAN AREA PATHOLOGY 22 | Facility: PATHOLOGY | Age: 62
End: 2022-06-02

## 2022-06-02 VITALS
DIASTOLIC BLOOD PRESSURE: 66 MMHG | SYSTOLIC BLOOD PRESSURE: 105 MMHG | WEIGHT: 214 LBS | HEART RATE: 64 BPM | RESPIRATION RATE: 18 BRPM | DIASTOLIC BLOOD PRESSURE: 70 MMHG | HEART RATE: 55 BPM | TEMPERATURE: 97.1 F | DIASTOLIC BLOOD PRESSURE: 66 MMHG | SYSTOLIC BLOOD PRESSURE: 105 MMHG | TEMPERATURE: 98.4 F | SYSTOLIC BLOOD PRESSURE: 131 MMHG | SYSTOLIC BLOOD PRESSURE: 130 MMHG | OXYGEN SATURATION: 100 % | HEART RATE: 56 BPM | SYSTOLIC BLOOD PRESSURE: 87 MMHG | SYSTOLIC BLOOD PRESSURE: 108 MMHG | DIASTOLIC BLOOD PRESSURE: 79 MMHG | HEART RATE: 55 BPM | RESPIRATION RATE: 17 BRPM | RESPIRATION RATE: 18 BRPM | HEART RATE: 54 BPM | RESPIRATION RATE: 14 BRPM | DIASTOLIC BLOOD PRESSURE: 42 MMHG | OXYGEN SATURATION: 100 % | TEMPERATURE: 98.4 F | SYSTOLIC BLOOD PRESSURE: 108 MMHG | SYSTOLIC BLOOD PRESSURE: 115 MMHG | DIASTOLIC BLOOD PRESSURE: 79 MMHG | SYSTOLIC BLOOD PRESSURE: 131 MMHG | WEIGHT: 214 LBS | WEIGHT: 214 LBS | SYSTOLIC BLOOD PRESSURE: 87 MMHG | DIASTOLIC BLOOD PRESSURE: 79 MMHG | SYSTOLIC BLOOD PRESSURE: 130 MMHG | HEART RATE: 54 BPM | HEART RATE: 69 BPM | DIASTOLIC BLOOD PRESSURE: 63 MMHG | OXYGEN SATURATION: 99 % | HEART RATE: 69 BPM | HEIGHT: 71 IN | SYSTOLIC BLOOD PRESSURE: 115 MMHG | SYSTOLIC BLOOD PRESSURE: 115 MMHG | TEMPERATURE: 97.1 F | DIASTOLIC BLOOD PRESSURE: 63 MMHG | HEIGHT: 71 IN | DIASTOLIC BLOOD PRESSURE: 70 MMHG | SYSTOLIC BLOOD PRESSURE: 105 MMHG | HEART RATE: 56 BPM | TEMPERATURE: 98.4 F | RESPIRATION RATE: 17 BRPM | DIASTOLIC BLOOD PRESSURE: 66 MMHG | HEART RATE: 56 BPM | DIASTOLIC BLOOD PRESSURE: 70 MMHG | SYSTOLIC BLOOD PRESSURE: 108 MMHG | SYSTOLIC BLOOD PRESSURE: 131 MMHG | RESPIRATION RATE: 16 BRPM | DIASTOLIC BLOOD PRESSURE: 42 MMHG | DIASTOLIC BLOOD PRESSURE: 42 MMHG | SYSTOLIC BLOOD PRESSURE: 130 MMHG | RESPIRATION RATE: 14 BRPM | RESPIRATION RATE: 16 BRPM | HEIGHT: 71 IN | RESPIRATION RATE: 17 BRPM | HEART RATE: 64 BPM | OXYGEN SATURATION: 100 % | HEART RATE: 55 BPM | RESPIRATION RATE: 18 BRPM | HEART RATE: 64 BPM | SYSTOLIC BLOOD PRESSURE: 87 MMHG | RESPIRATION RATE: 16 BRPM | HEART RATE: 54 BPM | TEMPERATURE: 97.1 F | HEART RATE: 69 BPM | RESPIRATION RATE: 14 BRPM | OXYGEN SATURATION: 99 % | DIASTOLIC BLOOD PRESSURE: 63 MMHG | OXYGEN SATURATION: 99 %

## 2022-06-02 DIAGNOSIS — K31.7 POLYP OF STOMACH AND DUODENUM: ICD-10-CM

## 2022-06-02 DIAGNOSIS — D50.9 IRON DEFICIENCY ANEMIA, UNSPECIFIED: ICD-10-CM

## 2022-06-02 PROCEDURE — G8918 PT W/O PREOP ORDER IV AB PRO: HCPCS | Performed by: INTERNAL MEDICINE

## 2022-06-02 PROCEDURE — 43239 EGD BIOPSY SINGLE/MULTIPLE: CPT | Performed by: INTERNAL MEDICINE

## 2022-06-02 PROCEDURE — 88305 TISSUE EXAM BY PATHOLOGIST: CPT | Performed by: PATHOLOGY

## 2022-06-02 RX ORDER — SODIUM SULFATE, POTASSIUM SULFATE, MAGNESIUM SULFATE 17.5; 3.13; 1.6 G/ML; G/ML; G/ML
SOLUTION, CONCENTRATE ORAL
Qty: 354 | Refills: 0 | Status: COMPLETED
Start: 2022-06-02 | End: 2022-07-21

## 2022-08-26 ENCOUNTER — AMBULATORY SURGICAL CENTER (OUTPATIENT)
Dept: URBAN - METROPOLITAN AREA SURGERY 3 | Facility: SURGERY | Age: 62
End: 2022-08-26
Payer: MEDICARE

## 2022-08-26 ENCOUNTER — AMBULATORY SURGICAL CENTER (OUTPATIENT)
Dept: URBAN - METROPOLITAN AREA SURGERY 3 | Facility: SURGERY | Age: 62
End: 2022-08-26

## 2022-08-26 VITALS
HEART RATE: 64 BPM | SYSTOLIC BLOOD PRESSURE: 106 MMHG | DIASTOLIC BLOOD PRESSURE: 65 MMHG | RESPIRATION RATE: 20 BRPM | RESPIRATION RATE: 19 BRPM | HEIGHT: 71 IN | WEIGHT: 200 LBS | SYSTOLIC BLOOD PRESSURE: 128 MMHG | SYSTOLIC BLOOD PRESSURE: 111 MMHG | RESPIRATION RATE: 20 BRPM | HEIGHT: 71 IN | SYSTOLIC BLOOD PRESSURE: 108 MMHG | SYSTOLIC BLOOD PRESSURE: 152 MMHG | HEART RATE: 66 BPM | TEMPERATURE: 98.2 F | SYSTOLIC BLOOD PRESSURE: 106 MMHG | OXYGEN SATURATION: 100 % | DIASTOLIC BLOOD PRESSURE: 67 MMHG | RESPIRATION RATE: 18 BRPM | SYSTOLIC BLOOD PRESSURE: 128 MMHG | SYSTOLIC BLOOD PRESSURE: 152 MMHG | OXYGEN SATURATION: 100 % | HEART RATE: 74 BPM | HEART RATE: 74 BPM | DIASTOLIC BLOOD PRESSURE: 68 MMHG | SYSTOLIC BLOOD PRESSURE: 111 MMHG | HEART RATE: 66 BPM | HEART RATE: 66 BPM | HEART RATE: 64 BPM | HEART RATE: 78 BPM | DIASTOLIC BLOOD PRESSURE: 68 MMHG | TEMPERATURE: 98.2 F | TEMPERATURE: 98.2 F | HEIGHT: 71 IN | RESPIRATION RATE: 18 BRPM | DIASTOLIC BLOOD PRESSURE: 83 MMHG | RESPIRATION RATE: 19 BRPM | DIASTOLIC BLOOD PRESSURE: 65 MMHG | DIASTOLIC BLOOD PRESSURE: 83 MMHG | SYSTOLIC BLOOD PRESSURE: 106 MMHG | OXYGEN SATURATION: 99 % | DIASTOLIC BLOOD PRESSURE: 86 MMHG | DIASTOLIC BLOOD PRESSURE: 83 MMHG | SYSTOLIC BLOOD PRESSURE: 152 MMHG | HEART RATE: 64 BPM | HEART RATE: 78 BPM | DIASTOLIC BLOOD PRESSURE: 86 MMHG | DIASTOLIC BLOOD PRESSURE: 67 MMHG | WEIGHT: 200 LBS | RESPIRATION RATE: 18 BRPM | SYSTOLIC BLOOD PRESSURE: 108 MMHG | RESPIRATION RATE: 20 BRPM | OXYGEN SATURATION: 99 % | TEMPERATURE: 97.8 F | DIASTOLIC BLOOD PRESSURE: 68 MMHG | SYSTOLIC BLOOD PRESSURE: 108 MMHG | RESPIRATION RATE: 19 BRPM | OXYGEN SATURATION: 99 % | DIASTOLIC BLOOD PRESSURE: 67 MMHG | TEMPERATURE: 97.8 F | SYSTOLIC BLOOD PRESSURE: 128 MMHG | SYSTOLIC BLOOD PRESSURE: 111 MMHG | TEMPERATURE: 97.8 F | HEART RATE: 78 BPM | HEART RATE: 74 BPM | OXYGEN SATURATION: 100 % | WEIGHT: 200 LBS | DIASTOLIC BLOOD PRESSURE: 65 MMHG | DIASTOLIC BLOOD PRESSURE: 86 MMHG

## 2022-08-26 DIAGNOSIS — D50.9 IRON DEFICIENCY ANEMIA, UNSPECIFIED: ICD-10-CM

## 2022-08-26 DIAGNOSIS — Z53.8 PROCEDURE AND TREATMENT NOT CARRIED OUT FOR OTHER REASONS: ICD-10-CM

## 2022-08-26 PROCEDURE — 45378 DIAGNOSTIC COLONOSCOPY: CPT | Mod: 53 | Performed by: INTERNAL MEDICINE

## 2022-08-26 PROCEDURE — 45378 DIAGNOSTIC COLONOSCOPY: CPT | Mod: 74 | Performed by: INTERNAL MEDICINE

## 2022-08-26 PROCEDURE — G8918 PT W/O PREOP ORDER IV AB PRO: HCPCS | Performed by: INTERNAL MEDICINE

## 2022-09-16 ENCOUNTER — AMBULATORY SURGICAL CENTER (OUTPATIENT)
Dept: URBAN - METROPOLITAN AREA SURGERY 3 | Facility: SURGERY | Age: 62
End: 2022-09-16
Payer: MEDICARE

## 2022-09-16 ENCOUNTER — AMBULATORY SURGICAL CENTER (OUTPATIENT)
Dept: URBAN - METROPOLITAN AREA SURGERY 3 | Facility: SURGERY | Age: 62
End: 2022-09-16

## 2022-09-16 VITALS
DIASTOLIC BLOOD PRESSURE: 71 MMHG | HEART RATE: 78 BPM | SYSTOLIC BLOOD PRESSURE: 145 MMHG | DIASTOLIC BLOOD PRESSURE: 95 MMHG | HEART RATE: 70 BPM | DIASTOLIC BLOOD PRESSURE: 98 MMHG | OXYGEN SATURATION: 96 % | DIASTOLIC BLOOD PRESSURE: 71 MMHG | SYSTOLIC BLOOD PRESSURE: 159 MMHG | DIASTOLIC BLOOD PRESSURE: 85 MMHG | SYSTOLIC BLOOD PRESSURE: 174 MMHG | DIASTOLIC BLOOD PRESSURE: 71 MMHG | HEART RATE: 77 BPM | SYSTOLIC BLOOD PRESSURE: 168 MMHG | HEART RATE: 79 BPM | RESPIRATION RATE: 16 BRPM | HEART RATE: 70 BPM | SYSTOLIC BLOOD PRESSURE: 162 MMHG | DIASTOLIC BLOOD PRESSURE: 92 MMHG | SYSTOLIC BLOOD PRESSURE: 170 MMHG | SYSTOLIC BLOOD PRESSURE: 154 MMHG | HEART RATE: 75 BPM | OXYGEN SATURATION: 99 % | DIASTOLIC BLOOD PRESSURE: 95 MMHG | DIASTOLIC BLOOD PRESSURE: 97 MMHG | RESPIRATION RATE: 16 BRPM | SYSTOLIC BLOOD PRESSURE: 159 MMHG | DIASTOLIC BLOOD PRESSURE: 97 MMHG | SYSTOLIC BLOOD PRESSURE: 168 MMHG | SYSTOLIC BLOOD PRESSURE: 168 MMHG | RESPIRATION RATE: 18 BRPM | OXYGEN SATURATION: 97 % | OXYGEN SATURATION: 96 % | WEIGHT: 185 LBS | SYSTOLIC BLOOD PRESSURE: 162 MMHG | HEART RATE: 75 BPM | SYSTOLIC BLOOD PRESSURE: 161 MMHG | SYSTOLIC BLOOD PRESSURE: 155 MMHG | WEIGHT: 185 LBS | RESPIRATION RATE: 17 BRPM | SYSTOLIC BLOOD PRESSURE: 161 MMHG | DIASTOLIC BLOOD PRESSURE: 98 MMHG | DIASTOLIC BLOOD PRESSURE: 85 MMHG | DIASTOLIC BLOOD PRESSURE: 99 MMHG | OXYGEN SATURATION: 98 % | HEIGHT: 71 IN | OXYGEN SATURATION: 100 % | DIASTOLIC BLOOD PRESSURE: 90 MMHG | RESPIRATION RATE: 17 BRPM | SYSTOLIC BLOOD PRESSURE: 161 MMHG | DIASTOLIC BLOOD PRESSURE: 90 MMHG | TEMPERATURE: 97.6 F | HEART RATE: 74 BPM | SYSTOLIC BLOOD PRESSURE: 154 MMHG | SYSTOLIC BLOOD PRESSURE: 170 MMHG | SYSTOLIC BLOOD PRESSURE: 155 MMHG | OXYGEN SATURATION: 99 % | SYSTOLIC BLOOD PRESSURE: 153 MMHG | DIASTOLIC BLOOD PRESSURE: 90 MMHG | WEIGHT: 185 LBS | DIASTOLIC BLOOD PRESSURE: 85 MMHG | HEART RATE: 74 BPM | RESPIRATION RATE: 18 BRPM | DIASTOLIC BLOOD PRESSURE: 92 MMHG | OXYGEN SATURATION: 98 % | DIASTOLIC BLOOD PRESSURE: 93 MMHG | DIASTOLIC BLOOD PRESSURE: 92 MMHG | OXYGEN SATURATION: 96 % | OXYGEN SATURATION: 99 % | SYSTOLIC BLOOD PRESSURE: 145 MMHG | SYSTOLIC BLOOD PRESSURE: 155 MMHG | SYSTOLIC BLOOD PRESSURE: 145 MMHG | DIASTOLIC BLOOD PRESSURE: 99 MMHG | SYSTOLIC BLOOD PRESSURE: 159 MMHG | DIASTOLIC BLOOD PRESSURE: 95 MMHG | TEMPERATURE: 97.3 F | OXYGEN SATURATION: 100 % | RESPIRATION RATE: 20 BRPM | DIASTOLIC BLOOD PRESSURE: 98 MMHG | RESPIRATION RATE: 16 BRPM | DIASTOLIC BLOOD PRESSURE: 93 MMHG | HEART RATE: 75 BPM | SYSTOLIC BLOOD PRESSURE: 174 MMHG | HEART RATE: 79 BPM | HEIGHT: 71 IN | HEART RATE: 74 BPM | SYSTOLIC BLOOD PRESSURE: 153 MMHG | HEART RATE: 70 BPM | OXYGEN SATURATION: 97 % | TEMPERATURE: 97.6 F | HEART RATE: 78 BPM | RESPIRATION RATE: 17 BRPM | SYSTOLIC BLOOD PRESSURE: 153 MMHG | OXYGEN SATURATION: 98 % | TEMPERATURE: 97.3 F | TEMPERATURE: 97.6 F | SYSTOLIC BLOOD PRESSURE: 170 MMHG | HEIGHT: 71 IN | DIASTOLIC BLOOD PRESSURE: 99 MMHG | SYSTOLIC BLOOD PRESSURE: 154 MMHG | DIASTOLIC BLOOD PRESSURE: 93 MMHG | SYSTOLIC BLOOD PRESSURE: 174 MMHG | RESPIRATION RATE: 18 BRPM | HEART RATE: 77 BPM | RESPIRATION RATE: 20 BRPM | HEART RATE: 79 BPM | TEMPERATURE: 97.3 F | RESPIRATION RATE: 20 BRPM | OXYGEN SATURATION: 100 % | SYSTOLIC BLOOD PRESSURE: 162 MMHG | OXYGEN SATURATION: 97 % | HEART RATE: 77 BPM | DIASTOLIC BLOOD PRESSURE: 97 MMHG | HEART RATE: 78 BPM

## 2022-09-16 DIAGNOSIS — D50.9 IRON DEFICIENCY ANEMIA, UNSPECIFIED: ICD-10-CM

## 2022-09-16 PROCEDURE — G8918 PT W/O PREOP ORDER IV AB PRO: HCPCS | Performed by: INTERNAL MEDICINE

## 2022-09-16 PROCEDURE — 45378 DIAGNOSTIC COLONOSCOPY: CPT | Performed by: INTERNAL MEDICINE

## 2022-09-16 NOTE — SERVICEHPINOTES
Mr. Berrios is a 63 yo AAM here for colonoscopy for iron deficiency anemia. He was only able to drink 1/2 the prep and had a poor prep 3 weeks back.

## 2022-09-16 NOTE — SERVICEHPINOTES
Mr. Berrios is a 61 yo AAM here for colonoscopy for iron deficiency anemia. He was only able to drink 1/2 the prep and had a poor prep 3 weeks back.

## 2023-06-07 ENCOUNTER — OFFICE (OUTPATIENT)
Dept: URBAN - METROPOLITAN AREA CLINIC 11 | Facility: CLINIC | Age: 63
End: 2023-06-07

## 2023-06-07 VITALS
HEART RATE: 78 BPM | DIASTOLIC BLOOD PRESSURE: 59 MMHG | SYSTOLIC BLOOD PRESSURE: 107 MMHG | WEIGHT: 177.4 LBS | HEIGHT: 71 IN | OXYGEN SATURATION: 100 %

## 2023-06-07 DIAGNOSIS — D50.9 IRON DEFICIENCY ANEMIA, UNSPECIFIED: ICD-10-CM

## 2023-06-07 DIAGNOSIS — R63.4 ABNORMAL WEIGHT LOSS: ICD-10-CM

## 2023-06-07 DIAGNOSIS — N18.9 CHRONIC KIDNEY DISEASE, UNSPECIFIED: ICD-10-CM

## 2023-06-07 DIAGNOSIS — K59.00 CONSTIPATION, UNSPECIFIED: ICD-10-CM

## 2023-06-07 DIAGNOSIS — K21.9 GASTRO-ESOPHAGEAL REFLUX DISEASE WITHOUT ESOPHAGITIS: ICD-10-CM

## 2023-06-07 PROCEDURE — 99214 OFFICE O/P EST MOD 30 MIN: CPT

## 2023-06-07 RX ORDER — MIRTAZAPINE 15 MG/1
15 TABLET, FILM COATED ORAL
Qty: 30 | Refills: 3 | Status: COMPLETED
Start: 2023-06-07 | End: 2023-08-17

## 2023-06-07 NOTE — SERVICENOTES
Case discussed with Dr. Hogan.  We discussed checking CBC and iron studies and consideration for starting mirtazapine and Reglan. Patient and wife would like to wait on reglan for now, as patient does not like taking medications and is at home by himself during the day.  I do not believe patient would tolerate gastric emptying study due to his early satiety and nausea.

## 2023-06-07 NOTE — SERVICEHPINOTES
Mr. Berrios is a 59 yo AAM with history of hypertension, hyperlipidemia, diabetes, stroke, iron deficiency anemia secondary to distal small bowel AVMs who is here for follow-up. Previous GI work up included EGD on September 6, 2018 which showed mild duodenitis and gastritis with a benign-appearing gastric polyp. Pathology revealed minimal chronic gastritis H pylori negative no intestinal metaplasia.Krysten underwent colonoscopy on September 7, 2018 which showed a 3 mm sessile polyp in ascending colon which pathology showed was inflammatory polyp and small internal hemorrhoids.Krysten had a capsule endoscopy 11/1/18 which showed diffiuse telangiectasias, more so in the distal small bowel. Push enteroscopy by Dr. Sánchez which was normal aside from mild esophagitis.Krysten had previous capsule showing distal small bowel AVMs. We discussed possibility of sending him for a double balloon enteroscopy, but decided not to since his blood counts improved so much off plavix.brLabs from April 21, 2020 reviewed from the [facility] [place]. At that time his hemoglobin is 14.8, hematocrit 44.4On follow up in April 22, 2020 He had 2 main GI complaints today. One being nausea and dry heaving every morning upon awakening. He very rarely throws up. He does not think he has much heartburn. He usually feels better throughout the day from a nausea standpoint. His wife relates that to his insulin and his altered eating and sleep habits. He eats very little throughout the day while she is at work. They eat a big dinner about 730 or 8 and then he snacks throughout the night and does not really go to sleep until 2 or 3:00 a.m..br other issue at that time was constipation. He was having a bowel movement on average every other day with incomplete evacuation. He states his bowels are never normal. He does not take Linzess anymore. We had talked about going to every other day dosing to help reduce his bloating.On follow-up 10/23/20, he was overall doing okay. He had lost 14 lb unintentionally. He was eating less and having early satiety. He still gagged first thing in the morning and was nauseated during the mornings and then felt great the rest of the day. His blood sugars had not been below 200 and had been in the 300s. He was moving his bowels most days, but had low volume stools. He was not taking Linzess or MiraLax. No hematochezia or melena. We proceeded with a 4 hour solid gastric emptying study on October 30, 2020 which was normal. Recent labs from Dr. Riavs's office showed a hemoglobin in normal range at 14.4.On follow-up 8/30/2021, he reported that he had lost another 30 pounds in the last 6 or 7 months. He was eating very little. He had no appetite. He was throwing up after the 1st bite approximately 2 to 3 times a week. In addition he reported he could no longer eat me as he cannot stand the texture and cannot swallow it down. He was moving his bowels on average every other day. No overt GI bleeding. He felt like he had a bulge on his right flank. On follow-up 5/5/22, patient denied any recent overt bleeding. Per Dr. Villatoro's notes from 03/29/2022, hemoglobin had improved 14 but then dropped to 9.9 with iron sat of 16%. He was prescribed IV iron him received 2 doses of this (4/11 &amp 4/25/22). They were recently seen by Nephrology, who changed his Lyrica from 300 mg a day to 150 mg per day, also stopped his clonidine as his blood pressure had improved with his weight loss. He had been off his ozempic since February. Patient endorsed a decent appetite but reported he just does not eat a lot. He had cut out meats from his diet for the most part, as he reported they no longer sound good. No dysphagia. Patient reported he does eat more vegetables. He denied any abdominal pain, nausea, vomiting, diarrhea, melena, hematochezia or mucus. Patient reported he has a bowel movement every day but stated he has to strain and usually sits for prolonged amount of time, stool described as hard and typically in little balls. He no longer takes that p.o. iron supplement as it was worsening his constipation. When asked about an improvement after iron, wife reports he typically bounces back and has more energy but has not this time. Patient reports he believes it is because his Lyrica makes him so sleepy. 
br
br   He underwent EGD on 06/02/2022 with normal esophagus and stomach. Sigmoidoscopy on 08/26/2022 with significant thick solid stool in rectum and sigmoid colon, unable to safely advance scope due to this. Recommended 2 day prep and rescheduling procedure he. He then underwent colonoscopy on 09/16/2022 which showed medium nonbleeding internal hemorrhoids, remainder of colonoscopy normal. No AVMs, ulcers, polyps or masses seen. He underwent CT of abdomen and pelvis without contrast on 01/31 23 which was an unremarkable exam. Ferritin normal on 01/31/2023, iron, TIBC and iron saturation normal on 01/21/2023 alk-phos mildly elevated at 190, H&ampH 12.6/37.7 respectively. Last IV iron infusion October 2022. Alk-phos 190 on 01/31/2023, hemoglobin 12.6, hematocrit 37.7.
br
br
  On follow-up today, patient is accompanied by his wife and they report patient stays weak and is cold all the time.  He has fallen twice and has not been eating well.  He reports he has an appetite and gets hungry but has very early satiety and cannot tolerate any meats (sounds like texture aversion).  Wife reports he does have increased belching. He will often have nausea if he gets hungry and does not eat right away.  He will have occasional vomiting.  Wife reports Saturday he threw up his breakfast and again that afternoon.  He can only tolerate pizza rolls and chocolate milk.  He takes Pantoprazole daily reports he has been on this medication for many years and does not believe he has any breakthrough pyrosis or reflux.  He denies any chest pain or abdominal pain.  He is maintained around 180 lb since January.  He is 177.4 # in clinic today.  He has a bowel movement every other day and describes this is small hard balls that occasionally require straining.  No blood.

## 2023-06-08 LAB
CBC, PLATELET, NO DIFFERENTIAL: HEMATOCRIT: 32.4 % — LOW (ref 37.5–51)
CBC, PLATELET, NO DIFFERENTIAL: HEMOGLOBIN: 10.5 G/DL — LOW (ref 13–17.7)
CBC, PLATELET, NO DIFFERENTIAL: MCH: 30.2 PG (ref 26.6–33)
CBC, PLATELET, NO DIFFERENTIAL: MCHC: 32.4 G/DL (ref 31.5–35.7)
CBC, PLATELET, NO DIFFERENTIAL: MCV: 93 FL (ref 79–97)
CBC, PLATELET, NO DIFFERENTIAL: PLATELETS: 383 X10E3/UL (ref 150–450)
CBC, PLATELET, NO DIFFERENTIAL: RBC: 3.48 X10E6/UL — LOW (ref 4.14–5.8)
CBC, PLATELET, NO DIFFERENTIAL: RDW: 12.3 % (ref 11.6–15.4)
CBC, PLATELET, NO DIFFERENTIAL: WBC: 6.5 X10E3/UL (ref 3.4–10.8)
FE+TIBC+FER: FERRITIN: 25 NG/ML — LOW (ref 30–400)
FE+TIBC+FER: IRON BIND.CAP.(TIBC): 347 UG/DL (ref 250–450)
FE+TIBC+FER: IRON SATURATION: 11 % — LOW (ref 15–55)
FE+TIBC+FER: IRON: 38 UG/DL (ref 38–169)
FE+TIBC+FER: UIBC: 309 UG/DL (ref 111–343)

## 2023-06-21 ENCOUNTER — OFFICE (OUTPATIENT)
Dept: URBAN - METROPOLITAN AREA CLINIC 11 | Facility: CLINIC | Age: 63
End: 2023-06-21

## 2023-06-21 VITALS
WEIGHT: 179 LBS | SYSTOLIC BLOOD PRESSURE: 119 MMHG | OXYGEN SATURATION: 100 % | DIASTOLIC BLOOD PRESSURE: 72 MMHG | HEIGHT: 71 IN | HEART RATE: 73 BPM

## 2023-06-21 DIAGNOSIS — D50.9 IRON DEFICIENCY ANEMIA, UNSPECIFIED: ICD-10-CM

## 2023-06-21 DIAGNOSIS — R63.4 ABNORMAL WEIGHT LOSS: ICD-10-CM

## 2023-06-21 PROCEDURE — 99213 OFFICE O/P EST LOW 20 MIN: CPT | Performed by: INTERNAL MEDICINE

## 2023-08-17 ENCOUNTER — OFFICE (OUTPATIENT)
Dept: URBAN - METROPOLITAN AREA CLINIC 11 | Facility: CLINIC | Age: 63
End: 2023-08-17

## 2023-08-17 VITALS
OXYGEN SATURATION: 100 % | DIASTOLIC BLOOD PRESSURE: 76 MMHG | HEART RATE: 72 BPM | HEIGHT: 71 IN | WEIGHT: 185 LBS | SYSTOLIC BLOOD PRESSURE: 131 MMHG

## 2023-08-17 DIAGNOSIS — K59.00 CONSTIPATION, UNSPECIFIED: ICD-10-CM

## 2023-08-17 DIAGNOSIS — D50.9 IRON DEFICIENCY ANEMIA, UNSPECIFIED: ICD-10-CM

## 2023-08-17 DIAGNOSIS — R63.4 ABNORMAL WEIGHT LOSS: ICD-10-CM

## 2023-08-17 PROCEDURE — 99212 OFFICE O/P EST SF 10 MIN: CPT | Performed by: INTERNAL MEDICINE
